# Patient Record
Sex: FEMALE | Race: WHITE | NOT HISPANIC OR LATINO
[De-identification: names, ages, dates, MRNs, and addresses within clinical notes are randomized per-mention and may not be internally consistent; named-entity substitution may affect disease eponyms.]

---

## 2023-01-21 ENCOUNTER — APPOINTMENT (OUTPATIENT)
Dept: ORTHOPEDIC SURGERY | Facility: CLINIC | Age: 48
End: 2023-01-21
Payer: COMMERCIAL

## 2023-01-21 VITALS — BODY MASS INDEX: 22.08 KG/M2 | WEIGHT: 120 LBS | HEIGHT: 62 IN

## 2023-01-21 DIAGNOSIS — M25.521 PAIN IN RIGHT ELBOW: ICD-10-CM

## 2023-01-21 DIAGNOSIS — M77.8 OTHER ENTHESOPATHIES, NOT ELSEWHERE CLASSIFIED: ICD-10-CM

## 2023-01-21 DIAGNOSIS — M77.11 LATERAL EPICONDYLITIS, RIGHT ELBOW: ICD-10-CM

## 2023-01-21 PROBLEM — Z00.00 ENCOUNTER FOR PREVENTIVE HEALTH EXAMINATION: Status: ACTIVE | Noted: 2023-01-21

## 2023-01-21 PROCEDURE — 99203 OFFICE O/P NEW LOW 30 MIN: CPT | Mod: 25

## 2023-01-21 PROCEDURE — 73080 X-RAY EXAM OF ELBOW: CPT | Mod: RT

## 2023-01-21 PROCEDURE — L3908: CPT | Mod: RT

## 2023-01-21 PROCEDURE — 20551 NJX 1 TENDON ORIGIN/INSJ: CPT

## 2023-01-21 RX ORDER — MELOXICAM 15 MG/1
15 TABLET ORAL DAILY
Qty: 30 | Refills: 1 | Status: COMPLETED | COMMUNITY
Start: 2023-01-21 | End: 2023-03-22

## 2023-01-21 NOTE — HISTORY OF PRESENT ILLNESS
[8] : 8 [9] : 9 [Constant] : constant [Household chores] : household chores [Work] : work [Sleep] : sleep [Nothing helps with pain getting better] : Nothing helps with pain getting better [de-identified] : 47 year old RHD presents with RIGHT elbow pain x two months. Atraumatic onset. Pain is localized to the lateral elbow. Wakes her qhs. Alleve without relief. She has tried 3 sessions of acupuncture.  Aspercreme was not helpful. No prior elbow issues. \par \par work: Remote - Keyboarding [] : no [FreeTextEntry1] : right elbow [FreeTextEntry7] : right elbow to right wrist

## 2023-01-21 NOTE — PHYSICAL EXAM
[Right] : right elbow [No acute displaced fracture or dislocation] : No acute displaced fracture or dislocation [] : negative tinel's [FreeTextEntry1] : + calcifications

## 2023-01-21 NOTE — DISCUSSION/SUMMARY
[de-identified] : 47F with R Lateral epicondylitis, Forearm tendinitis\par \par nature of dx discussed, treatment options reviewed\par CSI given today, tolerated well\par cryotherapy, post injection instructions\par reviewed counter force brace use\par wrist splint for keyboarding and qhs\par start PT\par stretching hep\par Mobic as directed\par Return with Dr. Jones 3 weeks\par

## 2023-01-21 NOTE — PROCEDURE
[Tendon Origin] : tendon origin [Right] : of the right [Lateral Epicondyle] : lateral epicondyle [Pain] : pain [Inflammation] : inflammation [Alcohol] : alcohol [Ethyl Chloride sprayed topically] : ethyl chloride sprayed topically [Sterile technique used] : sterile technique used [___ cc    1%] : Lidocaine ~Vcc of 1%  [___ cc    10mg] : Triamcinolone (Kenalog) ~Vcc of 10 mg  [Call if redness, pain or fever occur] : call if redness, pain or fever occur [Apply ice for 15min out of every hour for the next 12-24 hours as tolerated] : apply ice for 15 minutes out of every hour for the next 12-24 hours as tolerated